# Patient Record
Sex: FEMALE | NOT HISPANIC OR LATINO | URBAN - METROPOLITAN AREA
[De-identification: names, ages, dates, MRNs, and addresses within clinical notes are randomized per-mention and may not be internally consistent; named-entity substitution may affect disease eponyms.]

---

## 2022-08-26 ENCOUNTER — APPOINTMENT (OUTPATIENT)
Dept: URBAN - METROPOLITAN AREA CLINIC 206 | Age: 67
Setting detail: DERMATOLOGY
End: 2022-08-26

## 2022-08-26 DIAGNOSIS — L81.5 LEUKODERMA, NOT ELSEWHERE CLASSIFIED: ICD-10-CM

## 2022-08-26 DIAGNOSIS — L81.1 CHLOASMA: ICD-10-CM

## 2022-08-26 PROCEDURE — 99204 OFFICE O/P NEW MOD 45 MIN: CPT

## 2022-08-26 PROCEDURE — OTHER COUNSELING: OTHER

## 2022-08-26 PROCEDURE — OTHER PRESCRIPTION: OTHER

## 2022-08-26 PROCEDURE — OTHER PRESCRIPTION MEDICATION MANAGEMENT: OTHER

## 2022-08-26 PROCEDURE — OTHER PATIENT SPECIFIC COUNSELING: OTHER

## 2022-08-26 RX ORDER — FLUOCINOLONE ACETONIDE, HYDROQUINONE, AND TRETINOIN .1; 40; .5 MG/G; MG/G; MG/G
CREAM TOPICAL QHS
Qty: 30 | Refills: 3 | Status: ERX | COMMUNITY
Start: 2022-08-26

## 2022-08-26 ASSESSMENT — LOCATION ZONE DERM
LOCATION ZONE: ARM
LOCATION ZONE: FACE

## 2022-08-26 ASSESSMENT — LOCATION DETAILED DESCRIPTION DERM
LOCATION DETAILED: RIGHT INFERIOR CENTRAL MALAR CHEEK
LOCATION DETAILED: RIGHT VENTRAL PROXIMAL FOREARM
LOCATION DETAILED: LEFT MEDIAL BUCCAL CHEEK
LOCATION DETAILED: LEFT VENTRAL PROXIMAL FOREARM

## 2022-08-26 ASSESSMENT — LOCATION SIMPLE DESCRIPTION DERM
LOCATION SIMPLE: LEFT FOREARM
LOCATION SIMPLE: LEFT CHEEK
LOCATION SIMPLE: RIGHT CHEEK
LOCATION SIMPLE: RIGHT FOREARM

## 2022-08-26 NOTE — PROCEDURE: PRESCRIPTION MEDICATION MANAGEMENT
Initiate Treatment: Tri-Ada 0.01 %-4 %-0.05 % topical cream QHS\\nQuantity: 30.0 g\\nSig: Apply at bedtime to discolored skin x 2-3 months. Avoid sunlight.
Detail Level: Zone
Modify Regimen: After 2 months of Tri-Ada switch to Cysperra
Render In Strict Bullet Format?: No

## 2022-12-16 ENCOUNTER — APPOINTMENT (OUTPATIENT)
Dept: URBAN - METROPOLITAN AREA CLINIC 206 | Age: 67
Setting detail: DERMATOLOGY
End: 2022-12-19

## 2022-12-16 DIAGNOSIS — L81.1 CHLOASMA: ICD-10-CM

## 2022-12-16 PROCEDURE — OTHER PATIENT SPECIFIC COUNSELING: OTHER

## 2022-12-16 PROCEDURE — OTHER PRESCRIPTION MEDICATION MANAGEMENT: OTHER

## 2022-12-16 PROCEDURE — 99214 OFFICE O/P EST MOD 30 MIN: CPT

## 2022-12-16 PROCEDURE — OTHER COUNSELING: OTHER

## 2022-12-16 ASSESSMENT — LOCATION DETAILED DESCRIPTION DERM
LOCATION DETAILED: RIGHT FOREHEAD
LOCATION DETAILED: LEFT FOREHEAD
LOCATION DETAILED: LEFT CENTRAL MALAR CHEEK
LOCATION DETAILED: INFERIOR MID FOREHEAD
LOCATION DETAILED: RIGHT CENTRAL MALAR CHEEK

## 2022-12-16 ASSESSMENT — LOCATION SIMPLE DESCRIPTION DERM
LOCATION SIMPLE: LEFT CHEEK
LOCATION SIMPLE: RIGHT CHEEK
LOCATION SIMPLE: RIGHT FOREHEAD
LOCATION SIMPLE: LEFT FOREHEAD
LOCATION SIMPLE: INFERIOR FOREHEAD

## 2022-12-16 ASSESSMENT — LOCATION ZONE DERM: LOCATION ZONE: FACE

## 2022-12-16 NOTE — PROCEDURE: PRESCRIPTION MEDICATION MANAGEMENT
Continue Regimen: Continue Tri Daa but decrease to twice weekly.
Initiate Treatment: Cyspera
Detail Level: Zone
Render In Strict Bullet Format?: No

## 2022-12-16 NOTE — PROCEDURE: COUNSELING
Sunscreen Recommendations: The best sunscreens for melasma are those containing zinc oxide and iron oxide
Detail Level: Detailed

## 2022-12-16 NOTE — HPI: DISCOLORATION (MELASMA)
How Severe Are They?: moderate
Is This A New Presentation, Or A Follow-Up?: Follow Up Melasma
Additional History: Pt is also using sunscreen daily.